# Patient Record
Sex: FEMALE
[De-identification: names, ages, dates, MRNs, and addresses within clinical notes are randomized per-mention and may not be internally consistent; named-entity substitution may affect disease eponyms.]

---

## 2017-08-17 PROBLEM — Z00.00 ENCOUNTER FOR PREVENTIVE HEALTH EXAMINATION: Status: ACTIVE | Noted: 2017-08-17

## 2017-08-25 ENCOUNTER — APPOINTMENT (OUTPATIENT)
Dept: MATERNAL FETAL MEDICINE | Facility: CLINIC | Age: 32
End: 2017-08-25
Payer: COMMERCIAL

## 2017-08-25 ENCOUNTER — ASOB RESULT (OUTPATIENT)
Age: 32
End: 2017-08-25

## 2017-08-25 PROCEDURE — 99241 OFFICE CONSULTATION NEW/ESTAB PATIENT 15 MIN: CPT | Mod: 25

## 2017-09-18 ENCOUNTER — APPOINTMENT (OUTPATIENT)
Dept: MATERNAL FETAL MEDICINE | Facility: CLINIC | Age: 32
End: 2017-09-18
Payer: COMMERCIAL

## 2017-09-18 ENCOUNTER — ASOB RESULT (OUTPATIENT)
Age: 32
End: 2017-09-18

## 2017-09-18 PROCEDURE — 99213 OFFICE O/P EST LOW 20 MIN: CPT

## 2017-09-18 PROCEDURE — 99241 OFFICE CONSULTATION NEW/ESTAB PATIENT 15 MIN: CPT

## 2020-12-02 ENCOUNTER — OUTPATIENT (OUTPATIENT)
Dept: OUTPATIENT SERVICES | Facility: HOSPITAL | Age: 35
LOS: 1 days | End: 2020-12-02
Payer: COMMERCIAL

## 2020-12-02 DIAGNOSIS — Z11.59 ENCOUNTER FOR SCREENING FOR OTHER VIRAL DISEASES: ICD-10-CM

## 2020-12-02 LAB — SARS-COV-2 RNA SPEC QL NAA+PROBE: SIGNIFICANT CHANGE UP

## 2020-12-02 PROCEDURE — U0003: CPT

## 2020-12-03 DIAGNOSIS — Z11.59 ENCOUNTER FOR SCREENING FOR OTHER VIRAL DISEASES: ICD-10-CM

## 2021-11-21 ENCOUNTER — TRANSCRIPTION ENCOUNTER (OUTPATIENT)
Age: 36
End: 2021-11-21

## 2023-05-20 ENCOUNTER — NON-APPOINTMENT (OUTPATIENT)
Age: 38
End: 2023-05-20

## 2023-07-12 ENCOUNTER — APPOINTMENT (OUTPATIENT)
Dept: ORTHOPEDIC SURGERY | Facility: CLINIC | Age: 38
End: 2023-07-12
Payer: COMMERCIAL

## 2023-07-12 VITALS — BODY MASS INDEX: 30.58 KG/M2 | WEIGHT: 162 LBS | HEIGHT: 61 IN

## 2023-07-12 DIAGNOSIS — M19.012 PRIMARY OSTEOARTHRITIS, LEFT SHOULDER: ICD-10-CM

## 2023-07-12 DIAGNOSIS — Z78.9 OTHER SPECIFIED HEALTH STATUS: ICD-10-CM

## 2023-07-12 PROCEDURE — 73030 X-RAY EXAM OF SHOULDER: CPT | Mod: LT

## 2023-07-12 PROCEDURE — 99204 OFFICE O/P NEW MOD 45 MIN: CPT

## 2023-07-12 RX ORDER — COLESTIPOL HYDROCHLORIDE 1 G/1
TABLET, FILM COATED ORAL
Refills: 0 | Status: ACTIVE | COMMUNITY

## 2023-07-12 NOTE — PHYSICAL EXAM
[Left] : left shoulder [5 ___] : forward flexion 5[unfilled]/5 [5___] : internal rotation 5[unfilled]/5 [Standing] : standing [Mild] : mild [] : no sensory deficits [FreeTextEntry8] : mild [TWNoteComboBox7] : active forward flexion 180 degrees

## 2023-07-12 NOTE — ASSESSMENT
[FreeTextEntry1] : 07/12/23: X-rays today - Left shoulder, 2 views, reveals AC joint arthritis\par Underlying pathology reviewed and treatment options discussed. \par Start PT and HEP to improve mechanics and reduce pain. \par Activity modifier as tolerated. \par Questions addressed.\par \par The documentation recorded by the scribe accurately reflects the service I personally performed and the decisions made by me.\par I, Allison Dalalibe, attest that this documentation has been prepared under the direction and in the presence of Provider Tayo Abarca MD.\par \par The patient was seen by Tayo Abarca MD. \par

## 2023-07-12 NOTE — HISTORY OF PRESENT ILLNESS
[Sudden] : sudden [2] : 2 [0] : 0 [Tightness] : tightness [de-identified] : 07/12/23: 39 y/o RHD female c/o left shoulder pain that started after boxing in 5/2023. Describes pain in the superior shoulder without radiation. She has been altering exercise since. Denies history of prior injury.\par \par Occupation: speech pathologist [] : no [FreeTextEntry3] : 5/2023 [FreeTextEntry5] : pain start after boxing  [de-identified] : rest

## 2023-09-01 ENCOUNTER — APPOINTMENT (OUTPATIENT)
Dept: ORTHOPEDIC SURGERY | Facility: CLINIC | Age: 38
End: 2023-09-01
Payer: COMMERCIAL

## 2023-09-01 VITALS — WEIGHT: 162 LBS | BODY MASS INDEX: 30.58 KG/M2 | HEIGHT: 61 IN

## 2023-09-01 PROCEDURE — 72110 X-RAY EXAM L-2 SPINE 4/>VWS: CPT

## 2023-09-01 PROCEDURE — 99214 OFFICE O/P EST MOD 30 MIN: CPT

## 2023-09-01 PROCEDURE — 72170 X-RAY EXAM OF PELVIS: CPT

## 2023-09-01 RX ORDER — CYCLOBENZAPRINE HYDROCHLORIDE 5 MG/1
5 TABLET, FILM COATED ORAL 3 TIMES DAILY
Qty: 30 | Refills: 0 | Status: ACTIVE | COMMUNITY
Start: 2023-09-01 | End: 1900-01-01

## 2023-09-01 RX ORDER — MELOXICAM 15 MG/1
15 TABLET ORAL DAILY
Qty: 30 | Refills: 1 | Status: ACTIVE | COMMUNITY
Start: 2023-09-01 | End: 1900-01-01

## 2023-09-01 NOTE — ASSESSMENT
[FreeTextEntry1] : 38 F with chronic axial LBP  We will also provide a prescription for anti-inflammatories.  Discussed major side effects of medication including but not limited to gastritis and acute kidney injury.  She was instructed to take with food and to discontinue use if stomach or esophageal pain developed.  discussed risks of issues with stomach given gastric sleeve patient has tolerated nsaids in past  We will also prescribe Muscle Relaxants as needed.  Discussed side effects including but not limited to drowsiness and dizziness.  Discussed patient should not drive after taking the medicine. PT FU 6 weeks  if pain persists MRI L spine

## 2023-09-01 NOTE — HISTORY OF PRESENT ILLNESS
[Gradual] : gradual [5] : 5 [2] : 2 [Constant] : constant [de-identified] : 38 F with LBP.  Has had chronic LBP ever since she was teen. Always with activity.  Had weight loss surgery and began working out more but back pain persists.  Pain is in lower back worse when sitting or standing for long periods of time.  Nor adiation of pain. Nor radicular complaints. Has not tried any PT, chiro, acupuncture.    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 JOE PEPPER is a 38-year F presents today for evaluation of lumber spine. pt states she has history of lumber spine pain since when she was young.

## 2023-12-06 ENCOUNTER — APPOINTMENT (OUTPATIENT)
Dept: ORTHOPEDIC SURGERY | Facility: CLINIC | Age: 38
End: 2023-12-06

## 2024-02-24 ENCOUNTER — NON-APPOINTMENT (OUTPATIENT)
Age: 39
End: 2024-02-24

## 2024-06-19 ENCOUNTER — APPOINTMENT (OUTPATIENT)
Dept: ORTHOPEDIC SURGERY | Facility: CLINIC | Age: 39
End: 2024-06-19
Payer: COMMERCIAL

## 2024-06-19 DIAGNOSIS — M54.50 LOW BACK PAIN, UNSPECIFIED: ICD-10-CM

## 2024-06-19 PROCEDURE — 72100 X-RAY EXAM L-S SPINE 2/3 VWS: CPT

## 2024-06-19 PROCEDURE — 99213 OFFICE O/P EST LOW 20 MIN: CPT

## 2024-06-19 RX ORDER — METAXALONE 400 MG/1
400 TABLET ORAL 3 TIMES DAILY
Qty: 21 | Refills: 0 | Status: ACTIVE | COMMUNITY
Start: 2024-06-19 | End: 1900-01-01

## 2024-06-19 RX ORDER — DICLOFENAC SODIUM 75 MG/1
75 TABLET, DELAYED RELEASE ORAL
Qty: 60 | Refills: 0 | Status: ACTIVE | COMMUNITY
Start: 2024-06-19 | End: 1900-01-01

## 2024-06-19 NOTE — REASON FOR VISIT
Reports printed out, and patient notified that they are up front to be picked up [FreeTextEntry2] : F/U: lumber

## 2024-06-19 NOTE — IMAGING
[de-identified] : Spine: Inspection/Palpation: No tenderness to palpation throughout Cervical/thoracic/lumbar spine. No bony stepoffs, No lesions.  Gait: Non-antalgic, able to perform bilateral toe and heel rise.  Range of Motion: Lumbar Spine: Flexion to 90 degrees, Extension to 30 degrees, rotation 30 degrees bilaterally, lateral flexion to 30 degrees bilaterally.  Neurologic:  Bilateral Lower Extremities 5/5 Iliopsoas/Quadriceps/Hamstrings/ Tibialis Anterior/ Gastrocnemius. Extensor Hallucis Longus/ Flexor Hallucis Longus except   Sensation intact to light touch L2-S1  Patellar/ Achilles reflex within normal limits.  Negative straight leg raise  No pain with IR/ER/Flexion of Hips bilaterally   X-ray Ap/Lateral/Flexion/Extension of lumbar spine were viewed and interpreted.  mild l4-5 and l5-s1 degen changes. L4-5 slight spondylolisthesis  x-ray ap pelvis no fractures or OA

## 2024-06-19 NOTE — HISTORY OF PRESENT ILLNESS
[Gradual] : gradual [5] : 5 [2] : 2 [Constant] : constant [de-identified] : 06/19/2024: here for follow up, reports persistent low back pain since sept 2023. She was evaluated at that time with recommendation for PT, she was unable to complete. She hs low weight and been doing HEP with short term relief. She reports she now is experiencing a flare of increased low back pain for the past month. Denies radicular pain,n/t. Taking skelaxin prn.    38 F with LBP.  Has had chronic LBP ever since she was teen. Always with activity.  Had weight loss surgery and began working out more but back pain persists.  Pain is in lower back worse when sitting or standing for long periods of time.  Nor adiation of pain. Nor radicular complaints. Has not tried any PT, chiro, acupuncture.    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 JOE PEPPER is a 38-year F presents today for evaluation of lumber spine. pt states she has history of lumber spine pain since when she was young.

## 2024-06-19 NOTE — ASSESSMENT
[FreeTextEntry1] : 38 F with chronic axial LBP PT, new RX given  We will also prescribe Muscle Relaxants as needed.  Discussed side effects including but not limited to drowsiness and dizziness.  Discussed patient should not drive after taking the medicine. MRI of lumbar spine for further evaluation FU after MRI

## 2024-06-22 ENCOUNTER — APPOINTMENT (OUTPATIENT)
Dept: MRI IMAGING | Facility: CLINIC | Age: 39
End: 2024-06-22

## 2024-06-25 ENCOUNTER — APPOINTMENT (OUTPATIENT)
Dept: MRI IMAGING | Facility: CLINIC | Age: 39
End: 2024-06-25
Payer: COMMERCIAL

## 2024-06-25 PROCEDURE — 72148 MRI LUMBAR SPINE W/O DYE: CPT

## 2024-07-10 ENCOUNTER — APPOINTMENT (OUTPATIENT)
Dept: ORTHOPEDIC SURGERY | Facility: CLINIC | Age: 39
End: 2024-07-10
Payer: COMMERCIAL

## 2024-07-10 DIAGNOSIS — M54.50 LOW BACK PAIN, UNSPECIFIED: ICD-10-CM

## 2024-07-10 PROCEDURE — 99213 OFFICE O/P EST LOW 20 MIN: CPT

## 2024-07-22 ENCOUNTER — RX RENEWAL (OUTPATIENT)
Age: 39
End: 2024-07-22

## 2024-08-20 ENCOUNTER — NON-APPOINTMENT (OUTPATIENT)
Age: 39
End: 2024-08-20

## 2024-08-21 ENCOUNTER — APPOINTMENT (OUTPATIENT)
Dept: ORTHOPEDIC SURGERY | Facility: CLINIC | Age: 39
End: 2024-08-21
Payer: COMMERCIAL

## 2024-08-21 DIAGNOSIS — M54.50 LOW BACK PAIN, UNSPECIFIED: ICD-10-CM

## 2024-08-21 PROCEDURE — 99213 OFFICE O/P EST LOW 20 MIN: CPT

## 2024-08-21 NOTE — ASSESSMENT
[FreeTextEntry1] : 38 F with chronic axial LBP improved with PT  PT, new RX given  improving with PT transition to HEP  FU PRN

## 2024-08-21 NOTE — HISTORY OF PRESENT ILLNESS
[Gradual] : gradual [5] : 5 [2] : 2 [Constant] : constant [de-identified] : 08/21/2024: Patient returns today for a follow up for the lower back. She reports significant improvement since the last visit, states that PT helped.   7/10/24  here for followup. Got MRI .  Started PT seeing improvement after only 2 sessions.   06/19/2024: here for follow up, reports persistent low back pain since sept 2023. She was evaluated at that time with recommendation for PT, she was unable to complete. She hs low weight and been doing HEP with short term relief. She reports she now is experiencing a flare of increased low back pain for the past month. Denies radicular pain,n/t. Taking skelaxin prn.    38 F with LBP.  Has had chronic LBP ever since she was teen. Always with activity.  Had weight loss surgery and began working out more but back pain persists.  Pain is in lower back worse when sitting or standing for long periods of time.  Nor adiation of pain. Nor radicular complaints. Has not tried any PT, chiro, acupuncture.    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 JOE PEPPER is a 38-year F presents today for evaluation of lumber spine. pt states she has history of lumber spine pain since when she was young.

## 2024-08-21 NOTE — IMAGING
[de-identified] : Spine: Inspection/Palpation: No tenderness to palpation throughout Cervical/thoracic/lumbar spine. No bony stepoffs, No lesions.  Gait: Non-antalgic, able to perform bilateral toe and heel rise.  Range of Motion: Lumbar Spine: Flexion to 90 degrees, Extension to 30 degrees, rotation 30 degrees bilaterally, lateral flexion to 30 degrees bilaterally.  Neurologic:  Bilateral Lower Extremities 5/5 Iliopsoas/Quadriceps/Hamstrings/ Tibialis Anterior/ Gastrocnemius. Extensor Hallucis Longus/ Flexor Hallucis Longus except   Sensation intact to light touch L2-S1  Patellar/ Achilles reflex within normal limits.  Negative straight leg raise  No pain with IR/ER/Flexion of Hips bilaterally   X-ray Ap/Lateral/Flexion/Extension of lumbar spine were viewed and interpreted.  mild l4-5 and l5-s1 degen changes. L4-5 slight spondylolisthesis  x-ray ap pelvis no fractures or OA

## 2024-08-21 NOTE — DATA REVIEWED
[MRI] : MRI [Lumbar Spine] : lumbar spine [I independently reviewed and interpreted images and report] : I independently reviewed and interpreted images and report [FreeTextEntry1] : L4-5 and l5-s1 disc degeneration

## 2024-08-26 ENCOUNTER — RX RENEWAL (OUTPATIENT)
Age: 39
End: 2024-08-26

## 2024-09-25 ENCOUNTER — NON-APPOINTMENT (OUTPATIENT)
Age: 39
End: 2024-09-25

## 2025-03-30 ENCOUNTER — NON-APPOINTMENT (OUTPATIENT)
Age: 40
End: 2025-03-30